# Patient Record
Sex: MALE | Race: OTHER | Employment: OTHER | URBAN - METROPOLITAN AREA
[De-identification: names, ages, dates, MRNs, and addresses within clinical notes are randomized per-mention and may not be internally consistent; named-entity substitution may affect disease eponyms.]

---

## 2022-05-09 ENCOUNTER — HOSPITAL ENCOUNTER (EMERGENCY)
Facility: HOSPITAL | Age: 67
Discharge: HOME | End: 2022-05-09
Attending: EMERGENCY MEDICINE
Payer: MEDICARE

## 2022-05-09 VITALS
DIASTOLIC BLOOD PRESSURE: 67 MMHG | RESPIRATION RATE: 18 BRPM | TEMPERATURE: 99 F | HEART RATE: 93 BPM | SYSTOLIC BLOOD PRESSURE: 109 MMHG | WEIGHT: 185 LBS | OXYGEN SATURATION: 96 %

## 2022-05-09 DIAGNOSIS — N30.01 ACUTE CYSTITIS WITH HEMATURIA: Primary | ICD-10-CM

## 2022-05-09 DIAGNOSIS — R33.9 URINARY RETENTION: ICD-10-CM

## 2022-05-09 LAB
BILIRUB UR QL: NEGATIVE
COLOR UR: YELLOW
GLUCOSE UR-MCNC: NEGATIVE MG/DL
NITRITE UR QL STRIP.AUTO: POSITIVE
PH UR: 5 [PH] (ref 5–8)
PROT UR-MCNC: >=500 MG/DL
RBC #/AREA URNS AUTO: >10 /HPF
SP GR UR STRIP: 1.02 (ref 1–1.03)
UROBILINOGEN UR STRIP-ACNC: 2
VIT C UR-MCNC: NEGATIVE MG/DL
WBC #/AREA URNS AUTO: >50 /HPF
WBC CLUMPS UR QL AUTO: PRESENT /HPF

## 2022-05-09 PROCEDURE — 87086 URINE CULTURE/COLONY COUNT: CPT | Performed by: EMERGENCY MEDICINE

## 2022-05-09 PROCEDURE — 87077 CULTURE AEROBIC IDENTIFY: CPT | Performed by: EMERGENCY MEDICINE

## 2022-05-09 PROCEDURE — 99283 EMERGENCY DEPT VISIT LOW MDM: CPT

## 2022-05-09 PROCEDURE — 81001 URINALYSIS AUTO W/SCOPE: CPT | Performed by: EMERGENCY MEDICINE

## 2022-05-09 PROCEDURE — 87186 SC STD MICRODIL/AGAR DIL: CPT | Performed by: EMERGENCY MEDICINE

## 2022-05-09 RX ORDER — TRAMADOL HYDROCHLORIDE 50 MG/1
50 TABLET ORAL ONCE
Status: COMPLETED | OUTPATIENT
Start: 2022-05-09 | End: 2022-05-09

## 2022-05-09 RX ORDER — CEPHALEXIN 500 MG/1
500 CAPSULE ORAL 2 TIMES DAILY
Qty: 10 CAPSULE | Refills: 0 | Status: SHIPPED | OUTPATIENT
Start: 2022-05-09 | End: 2022-05-14

## 2022-05-09 RX ORDER — TRAMADOL HYDROCHLORIDE 50 MG/1
TABLET ORAL EVERY 6 HOURS PRN
Qty: 10 TABLET | Refills: 0 | Status: SHIPPED | OUTPATIENT
Start: 2022-05-09 | End: 2022-05-14

## 2022-05-09 NOTE — ED INITIAL ASSESSMENT (HPI)
Patient complains of pain to his penis since last night, received a indwelling cramer for retention early this antemeridian, states the pain is getting worse

## 2022-05-11 ENCOUNTER — TELEPHONE (OUTPATIENT)
Dept: SURGERY | Facility: CLINIC | Age: 67
End: 2022-05-11

## 2022-05-12 ENCOUNTER — OFFICE VISIT (OUTPATIENT)
Dept: SURGERY | Facility: CLINIC | Age: 67
End: 2022-05-12
Payer: COMMERCIAL

## 2022-05-12 DIAGNOSIS — N40.1 BPH WITH OBSTRUCTION/LOWER URINARY TRACT SYMPTOMS: ICD-10-CM

## 2022-05-12 DIAGNOSIS — N30.00 ACUTE CYSTITIS WITHOUT HEMATURIA: ICD-10-CM

## 2022-05-12 DIAGNOSIS — N13.8 BPH WITH OBSTRUCTION/LOWER URINARY TRACT SYMPTOMS: ICD-10-CM

## 2022-05-12 DIAGNOSIS — R33.9 URINARY RETENTION: Primary | ICD-10-CM

## 2022-05-12 PROCEDURE — 99203 OFFICE O/P NEW LOW 30 MIN: CPT | Performed by: PHYSICIAN ASSISTANT

## 2022-05-12 RX ORDER — TAMSULOSIN HYDROCHLORIDE 0.4 MG/1
0.4 CAPSULE ORAL NIGHTLY
COMMUNITY
Start: 2022-02-14

## 2022-05-12 RX ORDER — CEPHALEXIN 500 MG/1
500 CAPSULE ORAL 3 TIMES DAILY
Qty: 42 CAPSULE | Refills: 0 | Status: SHIPPED | OUTPATIENT
Start: 2022-05-12 | End: 2022-05-26

## 2022-05-12 NOTE — PATIENT INSTRUCTIONS
Please remove cramer catheter by 6-7 am the day of your follow-up appointment. OK to use leg bag during the day. Use gravity bag at night and while sleeping. Keep cramer bag below the level of the bladder at all times to prevent UTI. OK to shower. Below is the visual instruction for Cramer Catheter removal           You should cut the balloon port as noted in the pictures above. After you cut this, the balloon keeping the catheter in place will drain and sterile water will leak from the site you just cut. After the balloon has completely drained you should be able to gently slide the catheter out. If you have any questions or concerns, please call us at [phone]. IIf you have any questions or concerns, please call us at [phone]. Continue antibiotic. Take tamsulosin 0.8mg (2 capsules) daily. Remove cramer Monday morning. If any difficulties voiding come to the office in the afternoon. If voiding freely then please follow up in 1-2 weeks. If you are constipated: Take miralax, one capful twice daily until having daily soft bowel movements. Then decrease to taking it once daily with breakfast.  Take supplemental fiber daily (Citrucel or similar) with breakfast.  Increase vegetables and fruit in the diet, and try to decrease meat and dairy. Drink plenty of water. Try to keep the urine clear or light yellow during the day. Remember if the bowels are not moving well, the bladder will not work well either.

## 2022-05-12 NOTE — PROGRESS NOTES
Carol Carmona , [address] [facility]    Urology Consult Note    History of Present Illness:   Patient is a 79year old male with no known medical problems who presents today for consultation urinary retention. Patient seen at ED [facility] 5/9 for inability to void. Cramer placed. He was seen later that day by [place] ED because of penile pain with cramer catheter. UA from cramer appeared infected, he was discharged empirically on Keflex. Urine culture >100K Klebsiella R amp, T/S, macrobid. Notes that 3 weeks prior to ED visit, voiding became more difficult. Baseline LUTS -   Daytime frequency q 2 hours  Nocturia x 6-7  No hesitancy  +slow urine stream    2 months ago when he was in [facility], had a UTI. Has been seen by urologist in [place] and started on tamsulosin. Was told that one side of the prostate felt larger on examination. He did not have a prostate biopsy. Had been taking tamsulosin for 4 years previously but after PSA testing was 0.6 and he had normal FRANDY he was told he could stop. No other evaluation was completed. Rose Guzmán HISTORY:  No past medical history on file. No past surgical history on file. No family history on file. Social History: Social History    Tobacco Use      Smoking status: Never Smoker      Smokeless tobacco: Never Used    Alcohol use: Not on file    Drug use: Not on file       Allergies  No Known Allergies    Review of Systems:   A 10-point review of systems was completed and is negative other than as noted above. Physical Exam:   There were no vitals taken for this visit.     GENERAL APPEARANCE: well developed, well nourished, in no acute distress  NEUROLOGIC: no localizing neurologic signs, alert and oriented x 3, converses appropriately  HEAD: atraumatic, normocephalic  EYES: sclera non-icteric  ORAL CAVITY: mucosa moist  NECK/THYROID: no obvious masses or goiter  LUNGS: non-labored breathing  ABDOMEN: soft, nontender, nondistended  CVA: no CVA tenderness  PENILE MEATUS: open and in normal location  PENIS uncircumcised, cramer draining yellow urine concentrated  FRANDY deferred today   EXTREMITIES: warm, well-perfused. No clubbing, cyanosis or edema. SKIN: no obvious rashes    Results:     Laboratory Data:  No results found for: WBC, HGB, PLT  No results found for: NA, K, CL, CO2, BUN, GLU, GFRAA, AST, ALT, TP, ALB, PHOS, CA, MG    Urinalysis Results (last three years):  Recent Labs     05/09/22  1609   COLORUR Yellow   CLARITY Cloudy*   SPECGRAVITY 1.025   PHURINE 5.0   PROUR >=500*   GLUUR Negative   KETUR Trace*   BILUR Negative   BLOODURINE Large*   NITRITE Positive*   UROBILINOGEN 2.0*   LEUUR Moderate*   UASA Negative   WBCUR >50*   RBCUR >10*   BACUR 1+*       Urine Culture Results (last three years):  Lab Results   Component Value Date    URINECUL >100,000 CFU/ML Klebsiella pneumoniae (A) 05/09/2022       Imaging  No results found. Impression:     Patient is a 79year old male with no known medical problems who presents today for consultation urinary retention. Recently seen by urologist in [place], placed on tamsulosin temporarily but discontinued per patient because his PSA 0.6. Reviewed with daughter and patient potential causes of urinary retention. Discussed concern about cramer removal today without patient being on alpha blocker. Recommendations:  Resume tamsulosin 0.8mg nightly  Continue antibiotic as prescribed, extension sent to pharmacy  Cramer removal on Monday - PVR check in afternoon if any difficulties voiding. If voiding freely okay for patient to follow up in 1-2 weeks. Thank you very much for this consult. Please call if there are any questions or concerns.      Aurora Owusu PA-C  [facility]rology  [facility]    Date: 5/12/2022

## 2023-10-21 NOTE — TELEPHONE ENCOUNTER
Attempted to call patient, no VM picks up and no answer. Called daughter's number and LMTCB. Patient needs AM appointment. Patient should also be started on tamsulosin also for 2-3 days before cramer comes out. Free to schedule VV if they would like to discuss sooner. Normal